# Patient Record
Sex: FEMALE | Race: WHITE
[De-identification: names, ages, dates, MRNs, and addresses within clinical notes are randomized per-mention and may not be internally consistent; named-entity substitution may affect disease eponyms.]

---

## 2018-01-24 ENCOUNTER — HOSPITAL ENCOUNTER (EMERGENCY)
Dept: HOSPITAL 17 - PHED | Age: 33
Discharge: LEFT BEFORE DISCHARGE | End: 2018-01-24
Payer: SELF-PAY
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
## 2018-01-24 DIAGNOSIS — S89.90XA: Primary | ICD-10-CM
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
## 2018-01-24 PROCEDURE — 99281 EMR DPT VST MAYX REQ PHY/QHP: CPT

## 2018-01-26 ENCOUNTER — HOSPITAL ENCOUNTER (EMERGENCY)
Dept: HOSPITAL 17 - PHEFT | Age: 33
Discharge: HOME | End: 2018-01-26
Payer: MEDICAID

## 2018-01-26 VITALS
HEART RATE: 88 BPM | SYSTOLIC BLOOD PRESSURE: 139 MMHG | DIASTOLIC BLOOD PRESSURE: 69 MMHG | TEMPERATURE: 98.7 F | RESPIRATION RATE: 16 BRPM | OXYGEN SATURATION: 100 %

## 2018-01-26 VITALS — WEIGHT: 175.93 LBS | HEIGHT: 70 IN | BODY MASS INDEX: 25.19 KG/M2
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
## 2018-01-26 DIAGNOSIS — S83.92XA: Primary | ICD-10-CM
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
## 2018-01-26 PROCEDURE — 73564 X-RAY EXAM KNEE 4 OR MORE: CPT

## 2018-01-26 PROCEDURE — 99283 EMERGENCY DEPT VISIT LOW MDM: CPT

## 2018-01-26 PROCEDURE — E0113 CRUTCH UNDERARM EACH WOOD: HCPCS

## 2018-01-26 NOTE — RADRPT
EXAM DATE/TIME:  01/26/2018 15:32 

 

[facility] COMPARISON:     

No previous studies available for comparison.

 

                     

INDICATIONS :     

Left lateral knee pain post fall. 

                     

 

MEDICAL HISTORY :     

None.          

 

SURGICAL HISTORY :     

None.   

 

ENCOUNTER:     

Initial                                        

 

ACUITY:     

1 day      

 

PAIN SCORE:     

8/10

 

LOCATION:     

Left  knee

 

FINDINGS:     

Four view examination of the left knee demonstrates no evidence of fracture or dislocation.  Bony min
eralization is normal.  The articular surfaces are intact. There is a small joint effusion.

 

CONCLUSION:     Small joint effusion.

 

 

 

 Benjy Wooten Jr., MD on January 26, 2018 at 15:44           

Board Certified Radiologist.

 This report was verified electronically.

## 2018-01-26 NOTE — PD
HPI


Chief Complaint:  Musculoskeletal Complaint


Time Seen by Provider:  15:09


Travel History


International Travel<30 days:  No


Contact w/Intl Traveler<30days:  No


Traveled to known affect area:  No





History of Present Illness


HPI


This is a 32-year-old female here with left knee pain and swelling 2 days.  

She reports she was doing a back handspring and when she landed felt immediate 

pain in the left knee causing her to fall to the ground.  She has had pain and 

swelling since.  Pain is increased with weightbearing and flexion of the knee.  

Slightly relieved with rest.  She denies paresthesias or weakness of the 

extremity.  No prior surgeries or injuries to the knee.  Symptom severity is 

moderate.





PFSH


Past Medical History


Medical History:  Denies Significant Hx


Diminished Hearing:  No


Pregnant?:  Not Pregnant


LMP:  ONE WEEK


:  1


Para:  1





Past Surgical History


 Section:  Yes





Social History


Alcohol Use:  Yes (RARE)


Tobacco Use:  No


Substance Use:  No





Allergies-Medications


(Allergen,Severity, Reaction):  


Coded Allergies:  


     No Known Allergies (Verified  Adverse Reaction, Unknown, 18)


Reported Meds & Prescriptions





Reported Meds & Active Scripts


Active








Review of Systems


Except as stated in HPI:  all other systems reviewed are Neg





Physical Exam


Narrative


GENERAL: Alert and well-appearing female


SKIN: Warm and dry.


HEAD: Normocephalic.


EYES:  No injection or drainage. 


NECK: Supple, trachea midline. 


MUSCULOSKELETAL: No cyanosis.  Left lower extremity: Notable swelling to the 

left knee.  No deformity.  Exam is limited due to pain.  Joint is stable.  

Patient has limited flexion due to pain.  2+ popliteal and dorsal pedis pulse.  

Normal sensation.  Brisk cap refill.











Data


Data


Last Documented VS





Vital Signs








  Date Time  Temp Pulse Resp B/P (MAP) Pulse Ox O2 Delivery O2 Flow Rate FiO2


 


18 14:47 98.7 88 16 139/69 (92) 100   








Orders





 Orders


Knee, Complete (4vws) (18 )








[facility]


Medical Decision Making


Medical Screen Exam Complete:  Yes


Emergency Medical Condition:  Yes


Differential Diagnosis


Left knee pain: Fracture, ligamental injury, tendon injury


Narrative Course


32-year-old female here with left knee pain after landing a back handspring and 

twisting the knee.  She has had pain with weightbearing since.  The extremity 

is neurovascularly intact.  No obvious deformity.





X-ray negative for fracture.  Small joint effusion.





Findings discussed with patient.  Ace wrap applied.  Crutches.  Instructed to 

rest, ice, elevate the extremity.





Diagnosis





 Primary Impression:  


 Knee sprain


 Qualified Codes:  S83.92XA - Sprain of unspecified site of left knee, initial 

encounter


Referrals:  


Orthopedist





***Additional Instructions:  


Ibuprofen 800 mg every 6 hours as needed for pain.


Ice and elevate the extremity.


Ace wrap as directed.


Crutches for weightbearing.


Follow-up with orthopedic doctor.


Disposition:  01 DISCHARGE HOME


Condition:  Stable











Chantell Valdez 2018 15:18